# Patient Record
Sex: FEMALE | Race: BLACK OR AFRICAN AMERICAN
[De-identification: names, ages, dates, MRNs, and addresses within clinical notes are randomized per-mention and may not be internally consistent; named-entity substitution may affect disease eponyms.]

---

## 2017-04-21 ENCOUNTER — HOSPITAL ENCOUNTER (OUTPATIENT)
Dept: HOSPITAL 35 - SLEEPLAB | Age: 47
End: 2017-04-21
Attending: INTERNAL MEDICINE
Payer: COMMERCIAL

## 2017-04-21 DIAGNOSIS — G47.33: Primary | ICD-10-CM

## 2017-08-24 ENCOUNTER — HOSPITAL ENCOUNTER (EMERGENCY)
Dept: HOSPITAL 35 - ER | Age: 47
Discharge: LEFT BEFORE DISCHARGE | End: 2017-08-24
Payer: COMMERCIAL

## 2017-08-24 VITALS — HEIGHT: 59 IN | WEIGHT: 170 LBS | BODY MASS INDEX: 34.27 KG/M2

## 2017-08-24 DIAGNOSIS — Z85.3: ICD-10-CM

## 2017-08-24 DIAGNOSIS — Z90.710: ICD-10-CM

## 2017-08-24 DIAGNOSIS — Z88.8: ICD-10-CM

## 2017-08-24 DIAGNOSIS — J45.909: ICD-10-CM

## 2017-08-24 DIAGNOSIS — Z90.13: ICD-10-CM

## 2017-08-24 DIAGNOSIS — J18.9: Primary | ICD-10-CM

## 2017-08-24 DIAGNOSIS — A41.9: ICD-10-CM

## 2017-08-24 LAB
ANION GAP SERPL CALC-SCNC: 10 MMOL/L (ref 7–16)
BASOPHILS NFR BLD AUTO: 0.3 % (ref 0–2)
BUN SERPL-MCNC: 9 MG/DL (ref 7–18)
CALCIUM SERPL-MCNC: 9.1 MG/DL (ref 8.5–10.1)
CHLORIDE SERPL-SCNC: 98 MMOL/L (ref 98–107)
CO2 SERPL-SCNC: 27 MMOL/L (ref 21–32)
CREAT SERPL-MCNC: 0.7 MG/DL (ref 0.6–1)
EOSINOPHIL NFR BLD: 3 % (ref 0–3)
ERYTHROCYTE [DISTWIDTH] IN BLOOD BY AUTOMATED COUNT: 14.6 % (ref 10.5–14.5)
GLUCOSE SERPL-MCNC: 96 MG/DL (ref 74–106)
GRANULOCYTES NFR BLD MANUAL: 64.4 % (ref 36–66)
HCT VFR BLD CALC: 34.3 % (ref 37–47)
HGB BLD-MCNC: 11.2 GM/DL (ref 12–15)
LYMPHOCYTES NFR BLD AUTO: 20.4 % (ref 24–44)
MAGNESIUM SERPL-MCNC: 1.6 MG/DL (ref 1.8–2.4)
MANUAL DIFFERENTIAL PERFORMED BLD QL: NO
MCH RBC QN AUTO: 22.9 PG (ref 26–34)
MCHC RBC AUTO-ENTMCNC: 32.8 G/DL (ref 28–37)
MCV RBC: 69.7 FL (ref 80–100)
MONOCYTES NFR BLD: 11.9 % (ref 1–8)
NEUTROPHILS # BLD: 5.4 THOU/UL (ref 1.4–8.2)
PLATELET # BLD: 235 THOU/UL (ref 150–400)
POTASSIUM SERPL-SCNC: 3.4 MMOL/L (ref 3.5–5.1)
RBC # BLD AUTO: 4.92 MIL/UL (ref 4.2–5)
SODIUM SERPL-SCNC: 135 MMOL/L (ref 136–145)
TROPONIN I SERPL-MCNC: < 0.04 NG/ML
WBC # BLD AUTO: 8.4 THOU/UL (ref 4–11)

## 2017-09-14 ENCOUNTER — HOSPITAL ENCOUNTER (OUTPATIENT)
Dept: HOSPITAL 35 - RAD | Age: 47
End: 2017-09-14
Attending: INTERNAL MEDICINE
Payer: COMMERCIAL

## 2017-09-14 DIAGNOSIS — J45.909: Primary | ICD-10-CM

## 2017-09-29 ENCOUNTER — HOSPITAL ENCOUNTER (OUTPATIENT)
Dept: HOSPITAL 35 - SLEEPLAB | Age: 47
End: 2017-09-29
Attending: INTERNAL MEDICINE
Payer: COMMERCIAL

## 2017-09-29 DIAGNOSIS — G47.33: Primary | ICD-10-CM

## 2019-07-11 ENCOUNTER — HOSPITAL ENCOUNTER (INPATIENT)
Dept: HOSPITAL 35 - 4W | Age: 49
LOS: 1 days | Discharge: HOME | DRG: 343 | End: 2019-07-12
Attending: HOSPITALIST | Admitting: INTERNAL MEDICINE
Payer: COMMERCIAL

## 2019-07-11 VITALS — SYSTOLIC BLOOD PRESSURE: 149 MMHG | DIASTOLIC BLOOD PRESSURE: 88 MMHG

## 2019-07-11 VITALS — DIASTOLIC BLOOD PRESSURE: 76 MMHG | SYSTOLIC BLOOD PRESSURE: 136 MMHG

## 2019-07-11 VITALS — HEIGHT: 59.02 IN | WEIGHT: 187 LBS | BODY MASS INDEX: 37.7 KG/M2

## 2019-07-11 VITALS — SYSTOLIC BLOOD PRESSURE: 151 MMHG | DIASTOLIC BLOOD PRESSURE: 83 MMHG

## 2019-07-11 DIAGNOSIS — Z92.21: ICD-10-CM

## 2019-07-11 DIAGNOSIS — G89.29: ICD-10-CM

## 2019-07-11 DIAGNOSIS — Z90.49: ICD-10-CM

## 2019-07-11 DIAGNOSIS — Z79.899: ICD-10-CM

## 2019-07-11 DIAGNOSIS — K35.80: Primary | ICD-10-CM

## 2019-07-11 DIAGNOSIS — Z88.8: ICD-10-CM

## 2019-07-11 DIAGNOSIS — Z90.13: ICD-10-CM

## 2019-07-11 DIAGNOSIS — J45.909: ICD-10-CM

## 2019-07-11 DIAGNOSIS — Z85.3: ICD-10-CM

## 2019-07-11 DIAGNOSIS — M54.5: ICD-10-CM

## 2019-07-11 DIAGNOSIS — Z92.3: ICD-10-CM

## 2019-07-11 PROCEDURE — 54022: CPT

## 2019-07-11 PROCEDURE — 10047: CPT

## 2019-07-11 PROCEDURE — 0DTJ4ZZ RESECTION OF APPENDIX, PERCUTANEOUS ENDOSCOPIC APPROACH: ICD-10-PCS | Performed by: SURGERY

## 2019-07-11 PROCEDURE — 51975: CPT

## 2019-07-11 PROCEDURE — 53310: CPT

## 2019-07-11 PROCEDURE — 50249: CPT

## 2019-07-11 PROCEDURE — 53307: CPT

## 2019-07-11 PROCEDURE — 50411: CPT

## 2019-07-11 PROCEDURE — 51489: CPT

## 2019-07-11 PROCEDURE — 50558: CPT

## 2019-07-11 PROCEDURE — 50740 FUSION OF URETER & KIDNEY: CPT

## 2019-07-11 PROCEDURE — 50101: CPT

## 2019-07-11 PROCEDURE — 56526: CPT

## 2019-07-11 PROCEDURE — 70005: CPT

## 2019-07-11 PROCEDURE — 50555 KIDNEY ENDOSCOPY & BIOPSY: CPT

## 2019-07-11 PROCEDURE — 50962: CPT

## 2019-07-11 PROCEDURE — 52265 CYSTOSCOPY AND TREATMENT: CPT

## 2019-07-11 PROCEDURE — 50010 RENAL EXPLORATION: CPT

## 2019-07-11 PROCEDURE — 50739: CPT

## 2019-07-11 PROCEDURE — 54118: CPT

## 2019-07-11 PROCEDURE — 62900: CPT

## 2019-07-11 PROCEDURE — 56525: CPT

## 2019-07-11 PROCEDURE — 62110: CPT

## 2019-07-12 VITALS — SYSTOLIC BLOOD PRESSURE: 133 MMHG | DIASTOLIC BLOOD PRESSURE: 86 MMHG

## 2019-07-12 VITALS — DIASTOLIC BLOOD PRESSURE: 76 MMHG | SYSTOLIC BLOOD PRESSURE: 131 MMHG

## 2019-07-12 VITALS — DIASTOLIC BLOOD PRESSURE: 86 MMHG | SYSTOLIC BLOOD PRESSURE: 133 MMHG

## 2019-07-12 LAB
ANION GAP SERPL CALC-SCNC: 9 MMOL/L (ref 7–16)
APTT BLD: 30.9 SECONDS (ref 24.5–32.8)
BUN SERPL-MCNC: 10 MG/DL (ref 7–18)
CALCIUM SERPL-MCNC: 8.4 MG/DL (ref 8.5–10.1)
CHLORIDE SERPL-SCNC: 102 MMOL/L (ref 98–107)
CO2 SERPL-SCNC: 26 MMOL/L (ref 21–32)
CREAT SERPL-MCNC: 0.7 MG/DL (ref 0.6–1)
ERYTHROCYTE [DISTWIDTH] IN BLOOD BY AUTOMATED COUNT: 15.7 % (ref 10.5–14.5)
GLUCOSE SERPL-MCNC: 147 MG/DL (ref 74–106)
HCT VFR BLD CALC: 34 % (ref 37–47)
HGB BLD-MCNC: 10.6 GM/DL (ref 12–15)
INR PPP: 1
MCH RBC QN AUTO: 21.6 PG (ref 26–34)
MCHC RBC AUTO-ENTMCNC: 31.1 G/DL (ref 28–37)
MCV RBC: 69.6 FL (ref 80–100)
PLATELET # BLD: 317 THOU/UL (ref 150–400)
POTASSIUM SERPL-SCNC: 4.3 MMOL/L (ref 3.5–5.1)
PROTHROMBIN TIME: 10.3 SECONDS (ref 9.3–11.4)
RBC # BLD AUTO: 4.89 MIL/UL (ref 4.2–5)
SODIUM SERPL-SCNC: 137 MMOL/L (ref 136–145)
WBC # BLD AUTO: 13.5 THOU/UL (ref 4–11)

## 2019-08-25 ENCOUNTER — HOSPITAL ENCOUNTER (EMERGENCY)
Dept: HOSPITAL 35 - ER | Age: 49
Discharge: HOME | End: 2019-08-25
Payer: COMMERCIAL

## 2019-08-25 VITALS — DIASTOLIC BLOOD PRESSURE: 60 MMHG | SYSTOLIC BLOOD PRESSURE: 130 MMHG

## 2019-08-25 VITALS — WEIGHT: 180.01 LBS | HEIGHT: 59 IN | BODY MASS INDEX: 36.29 KG/M2

## 2019-08-25 DIAGNOSIS — Z88.8: ICD-10-CM

## 2019-08-25 DIAGNOSIS — Z98.890: ICD-10-CM

## 2019-08-25 DIAGNOSIS — Z85.3: ICD-10-CM
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
## 2019-08-25 DIAGNOSIS — Z90.710: ICD-10-CM

## 2019-08-25 DIAGNOSIS — Z90.49: ICD-10-CM

## 2019-08-25 DIAGNOSIS — J45.909: ICD-10-CM

## 2019-08-25 DIAGNOSIS — Z86.2: ICD-10-CM
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
## 2019-08-25 DIAGNOSIS — S82.851A: Primary | ICD-10-CM
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
## 2019-08-28 ENCOUNTER — HOSPITAL ENCOUNTER (OUTPATIENT)
Dept: HOSPITAL 35 - OR | Age: 49
Discharge: HOME | End: 2019-08-28
Attending: ORTHOPAEDIC SURGERY
Payer: COMMERCIAL

## 2019-08-28 VITALS — DIASTOLIC BLOOD PRESSURE: 84 MMHG | SYSTOLIC BLOOD PRESSURE: 130 MMHG

## 2019-08-28 VITALS — HEIGHT: 59 IN | BODY MASS INDEX: 37.09 KG/M2 | WEIGHT: 184 LBS

## 2019-08-28 VITALS — SYSTOLIC BLOOD PRESSURE: 130 MMHG | DIASTOLIC BLOOD PRESSURE: 84 MMHG

## 2019-08-28 DIAGNOSIS — Z98.890: ICD-10-CM

## 2019-08-28 DIAGNOSIS — Z85.3: ICD-10-CM

## 2019-08-28 DIAGNOSIS — S82.851A: Primary | ICD-10-CM

## 2019-08-28 DIAGNOSIS — K21.9: ICD-10-CM

## 2019-08-28 DIAGNOSIS — Z79.82: ICD-10-CM

## 2019-08-28 DIAGNOSIS — G47.30: ICD-10-CM

## 2019-08-28 DIAGNOSIS — Z90.49: ICD-10-CM
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
## 2019-08-28 DIAGNOSIS — Z87.19: ICD-10-CM

## 2019-08-28 DIAGNOSIS — J45.909: ICD-10-CM

## 2019-08-28 DIAGNOSIS — Z87.01: ICD-10-CM
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
## 2019-08-28 DIAGNOSIS — Z90.710: ICD-10-CM

## 2019-08-28 DIAGNOSIS — F32.9: ICD-10-CM

## 2019-08-28 DIAGNOSIS — Z79.899: ICD-10-CM

## 2019-08-28 DIAGNOSIS — Z88.8: ICD-10-CM

## 2019-08-28 PROCEDURE — 70005: CPT

## 2019-08-28 PROCEDURE — 57091: CPT

## 2019-08-28 PROCEDURE — 50101: CPT

## 2019-08-28 PROCEDURE — 56525: CPT

## 2019-08-28 PROCEDURE — 51272: CPT

## 2019-08-28 PROCEDURE — 62900: CPT

## 2019-08-28 PROCEDURE — 56667: CPT

## 2019-08-28 PROCEDURE — 62110: CPT

## 2019-08-28 PROCEDURE — 50386 REMOVE STENT VIA TRANSURETH: CPT

## 2019-08-28 PROCEDURE — 51412: CPT

## 2019-08-28 PROCEDURE — 65060: CPT

## 2019-08-28 PROCEDURE — 50010 RENAL EXPLORATION: CPT

## 2019-08-28 PROCEDURE — 51122: CPT

## 2019-08-28 PROCEDURE — 56524: CPT

## 2019-08-28 PROCEDURE — 51131: CPT

## 2019-08-28 PROCEDURE — 64039: CPT

## 2019-12-20 ENCOUNTER — HOSPITAL ENCOUNTER (OUTPATIENT)
Dept: HOSPITAL 35 - OR | Age: 49
Discharge: HOME | End: 2019-12-20
Attending: ORTHOPAEDIC SURGERY
Payer: COMMERCIAL

## 2019-12-20 VITALS — SYSTOLIC BLOOD PRESSURE: 135 MMHG | DIASTOLIC BLOOD PRESSURE: 79 MMHG

## 2019-12-20 VITALS — HEIGHT: 59 IN | BODY MASS INDEX: 36.29 KG/M2 | WEIGHT: 180 LBS

## 2019-12-20 DIAGNOSIS — Z98.890: ICD-10-CM

## 2019-12-20 DIAGNOSIS — Z79.899: ICD-10-CM

## 2019-12-20 DIAGNOSIS — G47.30: ICD-10-CM

## 2019-12-20 DIAGNOSIS — Z88.8: ICD-10-CM

## 2019-12-20 DIAGNOSIS — J45.909: ICD-10-CM

## 2019-12-20 DIAGNOSIS — T84.84XA: Primary | ICD-10-CM

## 2019-12-20 DIAGNOSIS — F32.9: ICD-10-CM

## 2019-12-20 DIAGNOSIS — Z90.710: ICD-10-CM
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
## 2019-12-20 DIAGNOSIS — M25.571: ICD-10-CM

## 2019-12-20 DIAGNOSIS — Z90.49: ICD-10-CM

## 2019-12-20 DIAGNOSIS — Z85.3: ICD-10-CM

## 2019-12-20 DIAGNOSIS — K21.9: ICD-10-CM

## 2019-12-20 PROCEDURE — 50101: CPT

## 2019-12-20 PROCEDURE — 64039: CPT

## 2019-12-20 PROCEDURE — 62110: CPT

## 2019-12-20 PROCEDURE — 70005: CPT

## 2019-12-20 PROCEDURE — 51412: CPT

## 2019-12-20 PROCEDURE — 50386 REMOVE STENT VIA TRANSURETH: CPT

## 2019-12-20 PROCEDURE — 50010 RENAL EXPLORATION: CPT

## 2019-12-20 PROCEDURE — 62900: CPT

## 2019-12-20 PROCEDURE — 57091: CPT

## 2019-12-20 PROCEDURE — 64043: CPT

## 2020-01-16 NOTE — O
[facility]
[place]   [address]                     OPERATIVE REPORT              
_______________________________________________________________________________
 
Name:       VALDEZ ANN             Room #:                     DEP [facility].#:      [id number]                       Account #:      [id number]  
Admission:  12/20/19    Attend Phys:    David Cowan MD
Discharge:  12/20/19    Date of Birth:  [birth year]  
                                                          Report #: [id number]
                                                                    [id number]   
_______________________________________________________________________________
THIS REPORT FOR:   //name//                          
 
CC: FRANCES physician/PCP
    David Cowan
 
DATE OF SERVICE:  12/20/2019
 
 
PREOPERATIVE DIAGNOSIS:  Right ankle retained hardware.
 
POSTOPERATIVE DIAGNOSIS:  Right ankle retained hardware.
 
PROCEDURE:  Right ankle hardware removal.
 
SURGEON:  Dr. David Cowan.
 
ASSISTANT:  Teodora Suarez.
 
ANESTHESIA:  General.
 
ESTIMATED BLOOD LOSS:  Minimal.
 
DRAINS:  No drains.
 
TOURNIQUET TIME:  30 minutes.
 
DESCRIPTION OF PROCEDURE:  The patient brought to the operating room where she
was placed under general anesthesia.  Once under adequate general anesthesia,
her right lower extremity was prepped and draped in sterile manner.  The
extremity was elevated, exsanguinated, tourniquet placed 300 mmHg.  Utilizing
fluoroscopy for guidance, the 2 distal and the 3 proximal screws were identified
in the fibula.  A 1 cm incision was made in each of these areas and the 2 screws
distally and 3 screws proximally were then removed through the small incisions. 
The small fragment screwdriver was used to remove the total of 5 screws from the
fibula.  The plate was then freed from the surrounding soft tissue with a key
elevator and the plate was elevated off the bone as well with a key elevator
with the plate being removed through the distal incision.  Utilizing fluoroscopy
for guidance, the screw heads for the two 4.0 cannulated screws were identified
and subsequently extracted with the small fragment screwdriver as well through 2
small stab incisions.  Once complete, the wounds were irrigated copiously and
closed with staples for the skin.  Wounds were dressed with Xeroform, 4 x 4s,
and sterile soft compressive dressing was placed.  Tourniquet was let down at 30
minutes.  Toes were pink and warm with good capillary refill.  There were no
 
 
 
[facility]
[address]
[place]   [address]                     OPERATIVE REPORT              
_______________________________________________________________________________
 
Name:       VALDEZ ANN             Room #:                     DEP [facility]..#:      [id number]                       Account #:      [id number]  
Admission:  12/20/19    Attend Phys:    David Cowan MD
Discharge:  12/20/19    Date of Birth:  [birth year]  
                                                          Report #: [id number]
                                                                    [id number]   
_______________________________________________________________________________
complications from the procedure.  The patient tolerated the procedure well and
was taken to recovery room without incident.
 
 
 
 
 
 
 
 
 
 
 
 
 
 
 
 
 
 
 
 
 
 
 
 
 
 
 
 
 
 
 
 
 
 
 
 
 
 
 
 
 
 
  <ELECTRONICALLY SIGNED>
   By: David Cowan MD        
  01/16/20     1024
D: 01/13/20 1714                           _____________________________________
T: 01/13/20 1935                           David Cowan MD          /nt

## 2020-03-07 ENCOUNTER — HOSPITAL ENCOUNTER (INPATIENT)
Dept: HOSPITAL 35 - ER | Age: 50
LOS: 2 days | Discharge: HOME | DRG: 202 | End: 2020-03-09
Attending: HOSPITALIST | Admitting: HOSPITALIST
Payer: COMMERCIAL

## 2020-03-07 VITALS — DIASTOLIC BLOOD PRESSURE: 84 MMHG | SYSTOLIC BLOOD PRESSURE: 147 MMHG

## 2020-03-07 VITALS — HEIGHT: 59 IN | BODY MASS INDEX: 34.27 KG/M2 | WEIGHT: 170 LBS

## 2020-03-07 VITALS — SYSTOLIC BLOOD PRESSURE: 129 MMHG | DIASTOLIC BLOOD PRESSURE: 90 MMHG

## 2020-03-07 VITALS — SYSTOLIC BLOOD PRESSURE: 147 MMHG | DIASTOLIC BLOOD PRESSURE: 84 MMHG

## 2020-03-07 DIAGNOSIS — Z92.3: ICD-10-CM

## 2020-03-07 DIAGNOSIS — Z90.13: ICD-10-CM

## 2020-03-07 DIAGNOSIS — Z83.3: ICD-10-CM

## 2020-03-07 DIAGNOSIS — Z85.3: ICD-10-CM

## 2020-03-07 DIAGNOSIS — K21.9: ICD-10-CM

## 2020-03-07 DIAGNOSIS — J45.901: Primary | ICD-10-CM

## 2020-03-07 DIAGNOSIS — Z79.899: ICD-10-CM

## 2020-03-07 DIAGNOSIS — Z90.49: ICD-10-CM

## 2020-03-07 DIAGNOSIS — Z98.891: ICD-10-CM

## 2020-03-07 DIAGNOSIS — Z90.710: ICD-10-CM

## 2020-03-07 DIAGNOSIS — Z79.51: ICD-10-CM

## 2020-03-07 DIAGNOSIS — Z28.82: ICD-10-CM

## 2020-03-07 DIAGNOSIS — I26.99: ICD-10-CM

## 2020-03-07 DIAGNOSIS — Z90.11: ICD-10-CM

## 2020-03-07 DIAGNOSIS — Z88.8: ICD-10-CM

## 2020-03-07 DIAGNOSIS — Z92.21: ICD-10-CM

## 2020-03-07 DIAGNOSIS — R07.81: ICD-10-CM

## 2020-03-07 LAB
ALBUMIN SERPL-MCNC: 3.2 G/DL (ref 3.4–5)
ALT SERPL-CCNC: 15 U/L (ref 30–65)
ANION GAP SERPL CALC-SCNC: 7 MMOL/L (ref 7–16)
APTT BLD: 31.3 SECONDS (ref 24.5–32.8)
AST SERPL-CCNC: 10 U/L (ref 15–37)
BASOPHILS NFR BLD AUTO: 0.3 % (ref 0–2)
BILIRUB SERPL-MCNC: < 0.1 MG/DL
BUN SERPL-MCNC: 10 MG/DL (ref 7–18)
CALCIUM SERPL-MCNC: 8.3 MG/DL (ref 8.5–10.1)
CHLORIDE SERPL-SCNC: 103 MMOL/L (ref 98–107)
CO2 SERPL-SCNC: 30 MMOL/L (ref 21–32)
CREAT SERPL-MCNC: 0.6 MG/DL (ref 0.6–1)
EOSINOPHIL NFR BLD: 4.1 % (ref 0–3)
ERYTHROCYTE [DISTWIDTH] IN BLOOD BY AUTOMATED COUNT: 15.6 % (ref 10.5–14.5)
GLUCOSE SERPL-MCNC: 116 MG/DL (ref 74–106)
GRANULOCYTES NFR BLD MANUAL: 47.9 % (ref 36–66)
HCT VFR BLD CALC: 32.6 % (ref 37–47)
HGB BLD-MCNC: 10.2 GM/DL (ref 12–15)
INR PPP: 1
LYMPHOCYTES NFR BLD AUTO: 36.8 % (ref 24–44)
MAGNESIUM SERPL-MCNC: 1.6 MG/DL (ref 1.8–2.4)
MCH RBC QN AUTO: 22.8 PG (ref 26–34)
MCHC RBC AUTO-ENTMCNC: 31.3 G/DL (ref 28–37)
MCV RBC: 73 FL (ref 80–100)
MONOCYTES NFR BLD: 10.9 % (ref 1–8)
NEUTROPHILS # BLD: 2.7 THOU/UL (ref 1.4–8.2)
PLATELET # BLD: 255 THOU/UL (ref 150–400)
POTASSIUM SERPL-SCNC: 3 MMOL/L (ref 3.5–5.1)
PROT SERPL-MCNC: 7.4 G/DL (ref 6.4–8.2)
PROTHROMBIN TIME: 9.9 SECONDS (ref 9.3–11.4)
RBC # BLD AUTO: 4.47 MIL/UL (ref 4.2–5)
SODIUM SERPL-SCNC: 140 MMOL/L (ref 136–145)
TROPONIN I SERPL-MCNC: <0.06 NG/ML (ref ?–0.06)
WBC # BLD AUTO: 5.6 THOU/UL (ref 4–11)

## 2020-03-07 PROCEDURE — 10081 I&D PILONIDAL CYST COMP: CPT

## 2020-03-08 VITALS — SYSTOLIC BLOOD PRESSURE: 125 MMHG | DIASTOLIC BLOOD PRESSURE: 75 MMHG

## 2020-03-08 VITALS — DIASTOLIC BLOOD PRESSURE: 60 MMHG | SYSTOLIC BLOOD PRESSURE: 124 MMHG

## 2020-03-08 VITALS — SYSTOLIC BLOOD PRESSURE: 124 MMHG | DIASTOLIC BLOOD PRESSURE: 57 MMHG

## 2020-03-08 VITALS — SYSTOLIC BLOOD PRESSURE: 136 MMHG | DIASTOLIC BLOOD PRESSURE: 73 MMHG

## 2020-03-08 VITALS — SYSTOLIC BLOOD PRESSURE: 149 MMHG | DIASTOLIC BLOOD PRESSURE: 80 MMHG

## 2020-03-08 VITALS — DIASTOLIC BLOOD PRESSURE: 77 MMHG | SYSTOLIC BLOOD PRESSURE: 132 MMHG

## 2020-03-08 LAB
%HYPO/RBC NFR BLD AUTO: (no result) %
ANISOCYTOSIS BLD QL SMEAR: (no result)
BASOPHILS NFR BLD AUTO: 0 % (ref 0–2)
EOSINOPHIL NFR BLD: 0 % (ref 0–3)
ERYTHROCYTE [DISTWIDTH] IN BLOOD BY AUTOMATED COUNT: 16.1 % (ref 10.5–14.5)
GRANULOCYTES NFR BLD MANUAL: 85 % (ref 36–66)
HCT VFR BLD CALC: 34.6 % (ref 37–47)
HGB BLD-MCNC: 10.7 GM/DL (ref 12–15)
LYMPHOCYTES NFR BLD AUTO: 14 % (ref 24–44)
MCH RBC QN AUTO: 22.8 PG (ref 26–34)
MCHC RBC AUTO-ENTMCNC: 31.1 G/DL (ref 28–37)
MCV RBC: 73.5 FL (ref 80–100)
MICROCYTES: (no result)
MONOCYTES NFR BLD: 1 % (ref 1–8)
NEUTROPHILS # BLD: 6 THOU/UL (ref 1.4–8.2)
NEUTS BAND NFR BLD: 0 % (ref 0–8)
PLATELET # BLD: 288 THOU/UL (ref 150–400)
RBC # BLD AUTO: 4.7 MIL/UL (ref 4.2–5)
WBC # BLD AUTO: 7 THOU/UL (ref 4–11)

## 2020-03-08 NOTE — NUR
RECEIVED REPORT FROM [facility] ED RN.PATIENT ARRIVED TO ROOM 206 AROUND
MIDNIGHT.PATIENT A/O X 4.DENIES PAIN AND COMPLAIN OF A LITTLE SOB.ON HEPARIN
GTT TITRATED PROTOCOL.MONITOR SHOWS TACHY.POC CONTINUED.

## 2020-03-08 NOTE — EKG
[facility]
[address]
[place]   [address]                     ELECTROCARDIOGRAM REPORT      
_______________________________________________________________________________
 
Name:       VALDEZ ANN             Room #:         206-       ADM IN  
M.R.#:      [id number]                       Account #:      [id number]  
Admission:  20    Attend Phys:    Luis Angel Triana MD    
Discharge:              Date of Birth:  [birth year]  
                                                          Report #: [id number]
                                                                    [id number]
_______________________________________________________________________________
THIS REPORT FOR:  
 
cc:  Arjun Mann MD, Richard James MD Lundgren,Ray GRANADO MD [facility]                                        ~
THIS REPORT FOR:   //name//                          
 
                         [facility] ED
                                       
Test Date:    2020               Test Time:    19:37:12
Pat Name:     VALDEZ ANN          Department:   
Patient ID:   [id number]            Room:         [facility]
Gender:       F                        Technician:   MPAJANE
:          1970               Requested By: Keke Shane
Order Number: [id number] MD:   Ray Rainey
                                 Measurements
Intervals                              Axis          
Rate:         106                      P:            56
MT:           182                      QRS:          33
QRSD:         92                       T:            31
QT:           353                                    
QTc:          469                                    
                           Interpretive Statements
Sinus tachycardia
Normal tracing
Compared to ECG 2019 14:17:19
Sinus tachycardia is now present
Electronically Signed On 3-8-2020 13:20:31 CDT by Ray Rainey
[email or web address]
 
 
 
 
 
 
 
 
 
 
 
 
 
 
 
  <ELECTRONICALLY SIGNED>
   By: Ray Rainey MD, [facility]   
  20     1320
D: 20                           _____________________________________
T: 20                           Ray Rainey MD, [facility]     /EPI

## 2020-03-08 NOTE — NUR
DR. CARRION PRESENT TO SEE PT. PT DENIES DISCOMFORT.  ST -130'S, UP TO
130'S WITH COUGHING/ACTIVITY. PT HAD COARSE WHEEZES, THEN COUGH CLEARED HER
LUNGS. ROOM AIR, RESP EVEN AND UNLABORED. VOIDS LARGE AMOUNT.

## 2020-03-09 VITALS — DIASTOLIC BLOOD PRESSURE: 62 MMHG | SYSTOLIC BLOOD PRESSURE: 127 MMHG

## 2020-03-09 VITALS — SYSTOLIC BLOOD PRESSURE: 128 MMHG | DIASTOLIC BLOOD PRESSURE: 75 MMHG

## 2020-03-09 VITALS — SYSTOLIC BLOOD PRESSURE: 142 MMHG | DIASTOLIC BLOOD PRESSURE: 72 MMHG

## 2020-03-09 VITALS — DIASTOLIC BLOOD PRESSURE: 75 MMHG | SYSTOLIC BLOOD PRESSURE: 128 MMHG

## 2020-03-09 VITALS — DIASTOLIC BLOOD PRESSURE: 72 MMHG | SYSTOLIC BLOOD PRESSURE: 142 MMHG

## 2020-03-09 NOTE — NUR
NO OVERNIGHT EVENTS. PT. SLEPT WELL THROUGH MOST OF NIGHT. HEADACHE PAIN
IMPROVED WITH MEDICATION. ASSESSMENTS AND VITAL SIGNS AS CHARTED. PT. IS
PROGRESSING TOWARDS GOALS. AWAITING APTT RESULTS. WILL CONTINUE TO MONITOR.

## 2021-03-12 ENCOUNTER — HOSPITAL ENCOUNTER (OUTPATIENT)
Dept: HOSPITAL 35 - LAB | Age: 51
End: 2021-03-12
Attending: SURGERY
Payer: COMMERCIAL

## 2021-03-12 DIAGNOSIS — Z20.822: ICD-10-CM

## 2021-03-12 DIAGNOSIS — Z01.812: Primary | ICD-10-CM

## 2021-03-17 ENCOUNTER — HOSPITAL ENCOUNTER (OUTPATIENT)
Dept: HOSPITAL 35 - OR | Age: 51
Discharge: HOME | End: 2021-03-17
Attending: SURGERY
Payer: COMMERCIAL

## 2021-03-17 VITALS — BODY MASS INDEX: 38.91 KG/M2 | HEIGHT: 59 IN | WEIGHT: 193 LBS

## 2021-03-17 VITALS — DIASTOLIC BLOOD PRESSURE: 90 MMHG | SYSTOLIC BLOOD PRESSURE: 136 MMHG

## 2021-03-17 VITALS — SYSTOLIC BLOOD PRESSURE: 136 MMHG | DIASTOLIC BLOOD PRESSURE: 90 MMHG

## 2021-03-17 DIAGNOSIS — J45.909: ICD-10-CM

## 2021-03-17 DIAGNOSIS — L72.0: Primary | ICD-10-CM

## 2021-03-17 DIAGNOSIS — Z85.3: ICD-10-CM

## 2021-03-17 DIAGNOSIS — K21.9: ICD-10-CM

## 2021-03-17 DIAGNOSIS — G47.30: ICD-10-CM

## 2021-03-17 DIAGNOSIS — Z86.2: ICD-10-CM

## 2021-03-17 DIAGNOSIS — Z90.49: ICD-10-CM

## 2021-03-17 DIAGNOSIS — Z90.710: ICD-10-CM

## 2021-03-17 DIAGNOSIS — Z98.890: ICD-10-CM

## 2021-03-17 DIAGNOSIS — Z88.8: ICD-10-CM

## 2021-03-17 DIAGNOSIS — Z79.899: ICD-10-CM

## 2021-03-17 PROCEDURE — 54118: CPT

## 2021-03-17 PROCEDURE — 62900: CPT

## 2021-03-17 PROCEDURE — 50010 RENAL EXPLORATION: CPT

## 2021-03-17 PROCEDURE — 50386 REMOVE STENT VIA TRANSURETH: CPT

## 2021-03-17 PROCEDURE — 62110: CPT

## 2021-03-17 PROCEDURE — 56526: CPT

## 2021-03-17 PROCEDURE — 56527: CPT

## 2021-03-17 PROCEDURE — 50101: CPT

## 2021-03-17 PROCEDURE — 70005: CPT

## 2021-03-17 PROCEDURE — 50417: CPT

## 2021-03-17 NOTE — O
[facility]
[address]
[place]   [address]                     OPERATIVE REPORT              
_______________________________________________________________________________
 
Name:       VALDEZ ANN             Room #:                     REG [facility].#:      [id number]                       Account #:      [id number]  
Admission:  03/17/21    Attend Phys:    Donovan Hay MD      
Discharge:              Date of Birth:  [birth year]  
                                                          Report #: [id number]
                                                                    [id number]   
_______________________________________________________________________________
THIS REPORT FOR:  
 
cc:  Arjun Mann MD, Richard James MD Chu,Donovan MITCHELL MD                                              ~
 
DATE OF SERVICE:  03/17/2021
 
 
PREOPERATIVE DIAGNOSIS:  The patient with history of right breast cancer and has
a left axillary mass, probably a sebaceous cyst.
 
POSTOPERATIVE DIAGNOSIS:  The patient with history of right breast cancer and
has a left axillary mass, probably a sebaceous cyst.
 
PROCEDURE PERFORMED:  Excision of left axillary lesion.
 
ANESTHESIA:  IV sedation and local 0.25% Marcaine.
 
COMPLICATIONS:  None.
 
BLOOD LOSS:  5 mL.
 
PROCEDURE NOTE:  With the patient under IV sedation, the left axilla was prepped
and draped in sterile fashion.  Timeout was performed.  A 0.25% Marcaine was
used to anesthetize the skin surrounding this.  The nodule was about 2 cm in
size.  After incising through the skin and subcutaneous tissue, the lesion was
removed in elliptical manner.  This included the cyst and the overlying skin. It
appears to be related to the skin not into the axillary node bearing area.  This
lesion was removed intact.  It did have appearance of a cyst externally.  This
was not violated.  The irrigation was performed.  Subcutaneous was
reapproximated with 3-0 PDS in interrupted fashion.  Skin was then closed with
4-0 PDS running subcuticular fashion.  Steri-Strips, 4 x 4, OpSite used for
dressing.  The patient tolerated the procedure well and was taken to recovery
room.
 
 
 
 
 
 
 
 
 
                         
   By:                               
                   
D: 03/23/21 1421                           _____________________________________
T: 03/23/21 1429                           Donovan Hay MD                /nt

## 2021-03-19 NOTE — PATH
[facility]
[address]
[place]   [address]                     PATHOLOGY RPT PROCEDURE       
_______________________________________________________________________________
 
Name:       MARISSAVALDEZ HERNANDEZ             Room #:                     REG [facility] 
M..#:      [id number]     Account #:      [id number]  
Admission:  03/17/21    Date of Birth:  [birth year]  
Discharge:                                              Report #:    [id number]
                                                        Path Case #: [id number]
_______________________________________________________________________________
 
LCA Accession Number: [id number]
.                                                                01
Material submitted:                                        .
axilla - LEFT AXILLARY MASS. Modifiers: left
.                                                                01
Clinical history:                                          .
EXCISION MASS
LEFT AXILLARY MASS
DS 3/17 EXCISION MASS/LEFT AXILLARY MASS HX OF BREAS
.                                                                02
**********************************************************************
Diagnosis:
Left axillary mass, excision:
- Mature keratinous cyst.
- Negative for malignancy.
- Skin with reactive changes.
.
(IUV:mml; 03/19/2021)
QLM  03/19/2021  1725 Local
**********************************************************************
.                                                                02
Electronically signed:                                     .
Stephanie Muhammad MD, Pathologist
NPI- [id number]
.                                                                01
Gross description:                                         .
The specimen is received in formalin, labeled   "VALDEZ ANN, left
axillary mass" and consist of an unoriented 3.5 x 1.5 x 2.5 cm portion of
wrinkled skin and subcutis which is inked black and sectioned to reveal an
intact 1.5 cm soft brown grumous filled cyst.  The remaining fibrofatty
tissue is unremarkable.  Entirely submitted as A1 tips and A2 through A-10
remainder of the specimen.([facility]; 3/18/2021)
ELY/ELY  03/18/2021 2021 Local
.                                                                02
Pathologist provided ICD-10:
L72.3
.                                                                02
CPT                                                        .
177908
Specimen Comment: A courtesy copy of this report has been sent to [phone],
[phone]
Specimen Comment: [phone]
Specimen Comment: Report sent to  / DR BENAVIDEZ
***Performed at:  01
   [facility], [place]  [id number]
   MD Manpreet Alegre MD Phone:  [phone]
 
 
[place] [address]                     PATHOLOGY RPT PROCEDURE       
_______________________________________________________________________________
 
Name:       VALDEZ ANN             Room #:                     REG [facility] 
[facility]RIST[facility].#:      [id number]     Account #:      [id number]  
Admission:  03/17/21    Date of Birth:  [birth year]  
Discharge:                                              Report #:    [id number]
                                                        Path Case #: [id number]
_______________________________________________________________________________
***Performed at:  02
   [address]  [id number]
   MD Stephanie Muhammad MD Phone:  [phone]

## 2021-07-02 ENCOUNTER — HOSPITAL ENCOUNTER (EMERGENCY)
Dept: HOSPITAL 35 - ER | Age: 51
Discharge: HOME | End: 2021-07-02
Payer: COMMERCIAL

## 2021-07-02 VITALS — WEIGHT: 185.01 LBS | HEIGHT: 59 IN | BODY MASS INDEX: 37.3 KG/M2

## 2021-07-02 VITALS — DIASTOLIC BLOOD PRESSURE: 79 MMHG | SYSTOLIC BLOOD PRESSURE: 124 MMHG

## 2021-07-02 DIAGNOSIS — Z88.8: ICD-10-CM

## 2021-07-02 DIAGNOSIS — R07.89: Primary | ICD-10-CM

## 2021-07-02 DIAGNOSIS — Z86.711: ICD-10-CM

## 2021-07-02 DIAGNOSIS — Z85.3: ICD-10-CM

## 2021-07-02 DIAGNOSIS — J45.909: ICD-10-CM

## 2021-07-02 DIAGNOSIS — Z79.899: ICD-10-CM

## 2021-07-02 LAB
ALBUMIN SERPL-MCNC: 3.7 G/DL (ref 3.4–5)
ALT SERPL-CCNC: 29 U/L (ref 14–59)
ANION GAP SERPL CALC-SCNC: 10 MMOL/L (ref 7–16)
AST SERPL-CCNC: 18 U/L (ref 15–37)
BILIRUB SERPL-MCNC: 0.3 MG/DL (ref 0.2–1)
BUN SERPL-MCNC: 15 MG/DL (ref 7–18)
CALCIUM SERPL-MCNC: 9.2 MG/DL (ref 8.5–10.1)
CHLORIDE SERPL-SCNC: 103 MMOL/L (ref 98–107)
CO2 SERPL-SCNC: 26 MMOL/L (ref 21–32)
CREAT SERPL-MCNC: 0.8 MG/DL (ref 0.6–1)
ERYTHROCYTE [DISTWIDTH] IN BLOOD BY AUTOMATED COUNT: 15.6 % (ref 10.5–14.5)
GLUCOSE SERPL-MCNC: 93 MG/DL (ref 74–106)
HCT VFR BLD CALC: 35.9 % (ref 37–47)
HGB BLD-MCNC: 11.3 GM/DL (ref 12–15)
MCH RBC QN AUTO: 22.6 PG (ref 26–34)
MCHC RBC AUTO-ENTMCNC: 31.4 G/DL (ref 28–37)
MCV RBC: 72.2 FL (ref 80–100)
PLATELET # BLD: 360 THOU/UL (ref 150–400)
POTASSIUM SERPL-SCNC: 3.5 MMOL/L (ref 3.5–5.1)
PROT SERPL-MCNC: 8.6 G/DL (ref 6.4–8.2)
RBC # BLD AUTO: 4.98 MIL/UL (ref 4.2–5)
SODIUM SERPL-SCNC: 139 MMOL/L (ref 136–145)
TROPONIN I SERPL-MCNC: <0.06 NG/ML (ref ?–0.06)
WBC # BLD AUTO: 7.5 THOU/UL (ref 4–11)

## 2021-07-03 NOTE — EKG
[address]  [address]
Phone:  [phone]                    ELECTROCARDIOGRAM REPORT      
_______________________________________________________________________________
 
Name:       MARISSAKALIEATA MARY             Room #:                     DEP [facility]#:      [id number]     Account #:      [id number]  
Admission:  21    Attend Phys:                          
Discharge:  21    Date of Birth:  [birth year]  
                                                          Report #: [id number]
   [id number]
_______________________________________________________________________________
                         [facility] ED
                                       
Test Date:    2021               Test Time:    19:33:37
Pat Name:     VALDEZ ANN          Department:   
Patient ID:   [id number]            Room:          
Gender:       F                        Technician:   YEN
:          1970               Requested By: Bea Castaneda
Order Number: [id number] MD:   Johnny Espinal
                                 Measurements
Intervals                              Axis          
Rate:         105                      P:            36
WI:           161                      QRS:          15
QRSD:         87                       T:            7
QT:           346                                    
QTc:          458                                    
                           Interpretive Statements
Sinus tachycardia
Left atrial enlargement
Compared to ECG 2020 19:37:12
Atrial abnormality now present
Electronically Signed On 7-3-2021 9:51:17 CDT by Johnny Espinal
[email or web address]
 
 
 
 
 
 
 
 
 
 
 
 
 
 
 
 
 
 
 
 
 
  <ELECTRONICALLY SIGNED>
   By: Johnny Espinal MD, [facility]    
  21     0951
D: 21 1933                           _____________________________________
T: 21 1933                           Johnny Espinal MD, FACC      /EPI

## 2022-02-09 ENCOUNTER — HOSPITAL ENCOUNTER (EMERGENCY)
Dept: HOSPITAL 35 - ER | Age: 52
Discharge: HOME | End: 2022-02-09
Payer: COMMERCIAL

## 2022-02-09 VITALS — HEIGHT: 59 IN | BODY MASS INDEX: 35.88 KG/M2 | WEIGHT: 178 LBS

## 2022-02-09 VITALS — DIASTOLIC BLOOD PRESSURE: 82 MMHG | SYSTOLIC BLOOD PRESSURE: 126 MMHG

## 2022-02-09 DIAGNOSIS — Z98.890: ICD-10-CM

## 2022-02-09 DIAGNOSIS — R11.2: Primary | ICD-10-CM

## 2022-02-09 DIAGNOSIS — Z88.8: ICD-10-CM

## 2022-02-09 DIAGNOSIS — J45.909: ICD-10-CM

## 2022-02-09 DIAGNOSIS — Z20.822: ICD-10-CM

## 2022-02-09 DIAGNOSIS — Z79.899: ICD-10-CM

## 2022-02-09 DIAGNOSIS — Z90.49: ICD-10-CM

## 2022-02-09 DIAGNOSIS — Z90.710: ICD-10-CM

## 2022-02-09 DIAGNOSIS — R10.84: ICD-10-CM

## 2022-02-09 DIAGNOSIS — K21.9: ICD-10-CM

## 2022-02-09 LAB
ALBUMIN SERPL-MCNC: 3.6 G/DL (ref 3.4–5)
ALT SERPL-CCNC: 26 U/L (ref 14–59)
ANION GAP SERPL CALC-SCNC: 12 MMOL/L (ref 7–16)
AST SERPL-CCNC: 30 U/L (ref 15–37)
BILIRUB SERPL-MCNC: 0.3 MG/DL (ref 0.2–1)
BILIRUB UR-MCNC: NEGATIVE MG/DL
BUN SERPL-MCNC: 16 MG/DL (ref 7–18)
CALCIUM SERPL-MCNC: 9.2 MG/DL (ref 8.5–10.1)
CHLORIDE SERPL-SCNC: 103 MMOL/L (ref 98–107)
CO2 SERPL-SCNC: 24 MMOL/L (ref 21–32)
COLOR UR: YELLOW
CREAT SERPL-MCNC: 0.5 MG/DL (ref 0.6–1)
ERYTHROCYTE [DISTWIDTH] IN BLOOD BY AUTOMATED COUNT: 15.1 % (ref 10.5–14.5)
GLUCOSE SERPL-MCNC: 106 MG/DL (ref 74–106)
HCT VFR BLD CALC: 37.2 % (ref 37–47)
HGB BLD-MCNC: 12 GM/DL (ref 12–15)
KETONES UR STRIP-MCNC: NEGATIVE MG/DL
LIPASE: 404 U/L (ref 73–393)
MAGNESIUM SERPL-MCNC: 1.9 MG/DL (ref 1.8–2.4)
MCH RBC QN AUTO: 22.7 PG (ref 26–34)
MCHC RBC AUTO-ENTMCNC: 32.2 G/DL (ref 28–37)
MCV RBC: 70.3 FL (ref 80–100)
PLATELET # BLD: 310 THOU/UL (ref 150–400)
POTASSIUM SERPL-SCNC: 4 MMOL/L (ref 3.5–5.1)
PROT SERPL-MCNC: 8.4 G/DL (ref 6.4–8.2)
RBC # BLD AUTO: 5.28 MIL/UL (ref 4.2–5)
RBC # UR STRIP: NEGATIVE /UL
SODIUM SERPL-SCNC: 139 MMOL/L (ref 136–145)
SP GR UR STRIP: 1.01 (ref 1–1.03)
URINE CLARITY: CLEAR
URINE GLUCOSE-RANDOM*: NEGATIVE
URINE LEUKOCYTES-REFLEX: NEGATIVE
URINE NITRITE-REFLEX: NEGATIVE
URINE PROTEIN (DIPSTICK): NEGATIVE
UROBILINOGEN UR STRIP-ACNC: 0.2 E.U./DL (ref 0.2–1)
WBC # BLD AUTO: 5.8 THOU/UL (ref 4–11)

## 2022-02-09 NOTE — EMS
[address]   [address]                     EMS Patient Care Report       
_______________________________________________________________________________
 
Name:       VALDEZ ANN             Room #:                     DEP [facility]#:      [id number]                       Account #:      [id number]  
Admission:  22    Attend Phys:                          
Discharge:  22    Date of Birth:  [birth year]  
                                                          Report #: [id number]
                                                                    [id number]
_______________________________________________________________________________
THIS REPORT FOR:   //name//                          
 
Report Transmitted: 02/10/2022 09:31
EMS Care Summary
[place], [place]/[facility]
Incident [id number] @ 2022 12:35
 
Incident Location
[address] [place] [address]
 
Patient
VALDEZ ANN
Female, 51 Years
 1970
 
Patient Address
[address] [place] [address]
 
Patient History
Asthma,Hypertension (HTN),Breast Cancer,Gallbladder Disease,
 
Patient Allergies
Compazine,
 
Patient Medications
Proair, Other,
 
Chief Complaint
chest pain after vomiting
 
Disposition
Transported No Lights/[place]
 
Dispatch Reason
Chest Pain (Non-Traumatic)
 
Transported To
[facility]
 
Narrative
Dispatched out on a chest pain. Upon arrival to the scene we met the Pt in the 
door way on the floor. Pt states she started throwing up this morning and every 
time she has thrown up she has been getting a pain in her chest wall. Pt states 
it been mainly clear yellow liquids coming up when she vomits. Pt would like to 
 
 
 
[place] [address]                     EMS Patient Care Report       
_______________________________________________________________________________
 
Name:       VALDEZ ANN             Room #:                     DEP [facility]#:      [id number]                       Account #:      [id number]  
Admission:  22    Attend Phys:                          
Discharge:  22    Date of Birth:  [birth year]  
                                                          Report #: [id number]
                                                                    [id number]
_______________________________________________________________________________
g get checked out for the pain she is having when she vomits. Vital signs are 
obtained and the Pt is moved to the cot for further treatment and transport. 
The monitor is placed for a 12 lead EKG. IV access is attempted without 
success. During transport Pt vomits additional times with a yellow clear fluid. 
Oral Zofran is administered for her nausea and she said it feels like it has 
helped. Upon arrival to the hospital Pt is moved to ER room 7 where she was 
moved to the ER bed. Pt care is transferred to nursing staff. 
 
Initial Vitals
@13:03P: 93,CO: 4,SpO2: 97,
@12:53P: 104,MI Suspected: false
@13:15P: 115,
@12:43P: 91,R: 16,BP: 130/89,Pain: 4/10,GCS: 15,SpO2: 98,Revised Trauma: 12,
@13:05P: 132,R: 16,GCS: 15,SpO2: 98,Revised Trauma: 12,
 
Assessments
@12:41MENTAL:Person Oriented,Event Oriented,Time Oriented,Place 
Oriented,SKIN:HEENT:Head/Face: No Abnormalities,Neck/Airway: No 
Abnormalities,LUNG SOUNDS:General: Vomiting,General: Nausea,ABDOMEN:General: 
Vomiting,General: Nausea,PELVIS//GI:No Abnormalities,EXTREMITIES:Left Arm: No 
Abnormalities,Right Arm: No Abnormalities,Left Leg: No Abnormalities,Right Leg: 
No Abnormalities,PULSE:Radial: 2+ Normal,NEURO:No Abnormalities, 
 
Impression
Chest Pain / Discomfort
 
Procedures
@13:05 Zofran - 4 Milligrams (mg) - Oral Response: Improved
@12:53 12-Lead ECG Response: UnchangedSucceeded
@12:41 ALS Assessment Response: UnchangedSucceeded
@12:55 IV Therapy - Saline Lock 10cc (20 ga) Site: Antecubital-Right Response: 
UnchangedFailed 
 
 
Timeline
12:34,Call Received
12:34,Dispatch Notified
12:35,Dispatched
12:35,En Route
12:39,On Scene
12:41,At Patient
12:41,ALS Assessment,Response: UnchangedSucceeded,
12:43,BP: 130/89 M,PULSE: 91,RR: 16 R,SPO2: 98 Ox,ETCO2:  ,BG: ,PAIN: 4,GCS: 15,
12:53,12-Lead ECG,Response: UnchangedSucceeded,
12:53,BP: / M,PULSE: 104,RR:  R,SPO2:  Ox,ETCO2:  ,BG: ,PAIN: ,GCS: ,
12:55,IV Therapy - Saline Lock 10cc 20 ga Site: Antecubital-Right,Response: 
 
 
 
[facility]
[address]
[place]   [address]                     EMS Patient Care Report       
_______________________________________________________________________________
 
Name:       MARISSAKALIEATA MARY             Room #:                     DEP [facility]#:      [id number]                       Account #:      [id number]  
Admission:  22    Attend Phys:                          
Discharge:  22    Date of Birth:  [birth year]  
                                                          Report #: [id number]
                                                                    [id number]
_______________________________________________________________________________
UnchangedFailed, 
13:02,Depart Scene
13:03,BP: / M,PULSE: 93,RR:  R,SPO2: 97 Ox,ETCO2:  ,BG: ,PAIN: ,GCS: ,
13:05,Zofran - 4 Milligrams (mg) - Oral,Response: Improved
13:05,BP: 130/ M,PULSE: 132,RR: 16 R,SPO2: 98 Ox,ETCO2:  ,BG: ,PAIN: ,GCS: 15,
13:15,BP: / M,PULSE: 115,RR:  R,SPO2:  Ox,ETCO2:  ,BG: ,PAIN: ,GCS: ,
13:21,At Destination
13:43,Call Closed
 
Disclaimer
v1.1     Copyright  [employer]
This EMS Care Summary contains data elements from the applicable legal record 
(which may be displayed differently). It is designed to provide pertinent 
information for the following purposes: continuity of care, clinical quality, 
and state data reporting. The complete legal record is available to ED staff 
and administrators of the receiving hospital in [employer] Patient Tracker. All data 
is provided "as is."

## 2022-02-10 NOTE — EKG
[place] [address] [facility]
[place]  [address]
Phone:  [phone]                    ELECTROCARDIOGRAM REPORT      
_______________________________________________________________________________
 
Name:       VALDEZ ANN MARY             Room #:                     DEP [facility]#:      [id number]     Account #:      [id number]  
Admission:  22    Attend Phys:                          
Discharge:  22    Date of Birth:  [birth year]  
                                                          Report #: [id number]
   [id number]
_______________________________________________________________________________
                         [facility] ED
                                       
Test Date:    2022               Test Time:    13:30:48
Pat Name:     VALDEZ ANN          Department:   
Patient ID:   [id number]            Room:          
Gender:       F                        Technician:   CLARENCE
:          1970               Requested By: Bea Castaneda
Order Number: [id number] MD:   Ray Rainey
                                 Measurements
Intervals                              Axis          
Rate:         111                      P:            53
NE:           182                      QRS:          57
QRSD:         73                       T:            30
QT:           322                                    
QTc:          438                                    
                           Interpretive Statements
Incomplete analysis due to missing data in precordial lead(s)
Sinus tachycardia
Borderline T wave abnormalities
Missing lead(s): V6
Compared to ECG 2021 19:33:37
T-wave abnormality now present
Electronically Signed On 2- 7:46:54 CST by Ray Rainey
[email or web address]
 
 
 
 
 
 
 
 
 
 
 
 
 
 
 
 
 
 
 
  <ELECTRONICALLY SIGNED>
   By: Ray Rainey MD, [facility]   
  02/10/22     0746
D: 22 1330                           _____________________________________
T: 22 1330                           Ray Rainey MD, [facility]     /EPI

## 2022-02-10 NOTE — EKG
[place] [address] [facility]
[place]  [address]
Phone:  [phone]                    ELECTROCARDIOGRAM REPORT      
_______________________________________________________________________________
 
Name:       VALDEZ ANN             Room #:                     [facility]#:      [id number]     Account #:      [id number]  
Admission:  22    Attend Phys:                          
Discharge:  22    Date of Birth:  [birth year]  
                                                          Report #: [id number]
   [id number]
_______________________________________________________________________________
                         [facility]
                                       
Test Date:    2022               Test Time:    16:39:58
Pat Name:     VALDEZ ANN          Department:   
Patient ID:   [id number]            Room:          
Gender:       F                        Technician:   CLARENCE
:          1970               Requested By: Bea Castaneda
Order Number: [id number] MD:   Ray Rainey
                                 Measurements
Intervals                              Axis          
Rate:         99                       P:            39
UT:           149                      QRS:          12
QRSD:         92                       T:            11
QT:           355                                    
QTc:          456                                    
                           Interpretive Statements
Sinus rhythm
Consider right atrial enlargement
Borderline T wave abnormalities
Compared to ECG 2022 13:30:48
Sinus tachycardia no longer present
Electronically Signed On 2- 7:50:36 CST by Ray Rainey
[email or web address]
 
 
 
 
 
 
 
 
 
 
 
 
 
 
 
 
 
 
 
 
  <ELECTRONICALLY SIGNED>
   By: Ray Rainey MD, [facility]   
  02/10/22     0750
D: 22 1639                           _____________________________________
T: 22 1639                           Ray Rainey MD, FACC     /EPI

## 2024-10-11 NOTE — NUR
Left a message at PCP, H&P requested.     PT CARE ASSUMED APPROXIMATELY 0700. PT ASSESSMENTS AS CHARTED. PT MEDICATION
AS CHARTED. PT DISCHARGED. TELEMETRY DC'D. IV DC'D. NEW MEDICATIONS REVIEWED
WITH PT. PT DENIES PAIN.